# Patient Record
Sex: FEMALE | Race: OTHER | Employment: OTHER | URBAN - METROPOLITAN AREA
[De-identification: names, ages, dates, MRNs, and addresses within clinical notes are randomized per-mention and may not be internally consistent; named-entity substitution may affect disease eponyms.]

---

## 2018-12-17 ENCOUNTER — NEW PATIENT COMPREHENSIVE (OUTPATIENT)
Dept: URBAN - METROPOLITAN AREA CLINIC 35 | Facility: CLINIC | Age: 83
End: 2018-12-17

## 2018-12-17 DIAGNOSIS — H43.812: ICD-10-CM

## 2018-12-17 DIAGNOSIS — H35.3130: ICD-10-CM

## 2018-12-17 PROCEDURE — 4040F PNEUMOC VAC/ADMIN/RCVD: CPT

## 2018-12-17 PROCEDURE — 1036F TOBACCO NON-USER: CPT

## 2018-12-17 PROCEDURE — 4177F TALK PT/CRGVR RE AREDS PREV: CPT

## 2018-12-17 PROCEDURE — G8483 FLU IMM NO ADMIN DOC REA: HCPCS

## 2018-12-17 PROCEDURE — 92134 CPTRZ OPH DX IMG PST SGM RTA: CPT

## 2018-12-17 PROCEDURE — G8427 DOCREV CUR MEDS BY ELIG CLIN: HCPCS

## 2018-12-17 PROCEDURE — 99204 OFFICE O/P NEW MOD 45 MIN: CPT

## 2018-12-17 PROCEDURE — 92015 DETERMINE REFRACTIVE STATE: CPT

## 2018-12-17 PROCEDURE — G9903 PT SCRN TBCO ID AS NON USER: HCPCS

## 2018-12-17 PROCEDURE — 2019F DILATED MACUL EXAM DONE: CPT

## 2018-12-17 ASSESSMENT — VISUAL ACUITY
OS_CC: 20/40-1
OD_CC: J1
OS_SC: 20/100
OS_SC: J10
OD_SC: J10
OD_SC: 20/200
OD_CC: 20/50-2
OS_CC: J3

## 2018-12-17 ASSESSMENT — TONOMETRY
OD_IOP_MMHG: 14
OS_IOP_MMHG: 14

## 2019-02-04 NOTE — PATIENT DISCUSSION
CYLINDER/AXIS CHALLENGED AND CONFIRMED MULTIPLE TIMES - PT. PREFERS AXIS OF MANIFEST (3.0 X 177 OD  2.5 X 156 OS) VS MAPS.

## 2019-02-04 NOTE — PATIENT DISCUSSION
MYOPIA, OU- DISC OPT OF REFRACTIVE [id number]. PT UNDERSTANDS OPTIONS AND DESIRES TO PROCEED WITH REFRACTIVE SX TO IMPROVE VA AND REDUCE DEPENDENCY ON GLS/CTLS.

## 2019-02-14 NOTE — PATIENT DISCUSSION
MYOPIA, OU- DISC OPT OF REFRACTIVE [id number]. PT UNDERSTANDS OPTIONS AND DESIRES TO PROCEED WITH REFRACTIVE SX TO IMPROVE VA AND REDUCE DEPENDENCY ON GLS/CTLS.

## 2019-04-01 NOTE — PATIENT DISCUSSION
OK TO REDUCE ATS TO QID AND USE NON PRESERVED DROPS. STRESSED IMPORTANCE OF WEARING SUNGLASSES WHEN OUTSIDE.

## 2019-06-24 ENCOUNTER — ESTABLISHED COMPREHENSIVE EXAM (OUTPATIENT)
Dept: URBAN - METROPOLITAN AREA CLINIC 35 | Facility: CLINIC | Age: 84
End: 2019-06-24

## 2019-06-24 DIAGNOSIS — H52.03: ICD-10-CM

## 2019-06-24 DIAGNOSIS — H43.812: ICD-10-CM

## 2019-06-24 DIAGNOSIS — H35.3130: ICD-10-CM

## 2019-06-24 DIAGNOSIS — H04.123: ICD-10-CM

## 2019-06-24 PROCEDURE — 92134 CPTRZ OPH DX IMG PST SGM RTA: CPT

## 2019-06-24 PROCEDURE — 92015 DETERMINE REFRACTIVE STATE: CPT

## 2019-06-24 PROCEDURE — 92014 COMPRE OPH EXAM EST PT 1/>: CPT

## 2019-06-24 ASSESSMENT — VISUAL ACUITY
OD_CC: J10
OS_CC: 20/40
OS_CC: J5
OD_CC: 20/200

## 2019-06-24 ASSESSMENT — KERATOMETRY
OD_K2POWER_DIOPTERS: 43.5
OD_K1POWER_DIOPTERS: 46.5

## 2019-06-24 ASSESSMENT — TONOMETRY
OD_IOP_MMHG: 13
OS_IOP_MMHG: 12

## 2019-09-20 NOTE — PATIENT DISCUSSION
6 MONTH POST LASIK OU - DOING WELL, RELEASED TO ROUTINE CARE. PT. UNDERSTANDS THE IMPORTANCE OF ANNUAL CARE TO KEEP VFL ACTIVE.

## 2021-04-13 ENCOUNTER — ESTABLISHED COMPREHENSIVE EXAM (OUTPATIENT)
Dept: URBAN - METROPOLITAN AREA CLINIC 35 | Facility: CLINIC | Age: 86
End: 2021-04-13

## 2021-04-13 DIAGNOSIS — H35.3221: ICD-10-CM

## 2021-04-13 DIAGNOSIS — H35.3110: ICD-10-CM

## 2021-04-13 DIAGNOSIS — H04.123: ICD-10-CM

## 2021-04-13 DIAGNOSIS — H43.812: ICD-10-CM

## 2021-04-13 DIAGNOSIS — H52.03: ICD-10-CM

## 2021-04-13 PROCEDURE — 92015 DETERMINE REFRACTIVE STATE: CPT

## 2021-04-13 PROCEDURE — 92134 CPTRZ OPH DX IMG PST SGM RTA: CPT

## 2021-04-13 PROCEDURE — 92014 COMPRE OPH EXAM EST PT 1/>: CPT

## 2021-04-13 ASSESSMENT — VISUAL ACUITY
OS_CC: J2
OS_CC: 20/60-1
OD_CC: 20/40
OD_CC: J2

## 2021-04-13 ASSESSMENT — KERATOMETRY
OD_K2POWER_DIOPTERS: 44
OS_K1POWER_DIOPTERS: 46.25
OS_K2POWER_DIOPTERS: 43.50
OD_K1POWER_DIOPTERS: 46.25

## 2021-04-13 ASSESSMENT — TONOMETRY
OS_IOP_MMHG: 12
OD_IOP_MMHG: 12

## 2021-04-22 ENCOUNTER — RETINA CONSULT (OUTPATIENT)
Dept: URBAN - METROPOLITAN AREA CLINIC 35 | Facility: CLINIC | Age: 86
End: 2021-04-22

## 2021-04-22 DIAGNOSIS — H35.3221: ICD-10-CM

## 2021-04-22 DIAGNOSIS — H35.3112: ICD-10-CM

## 2021-04-22 DIAGNOSIS — H35.732: ICD-10-CM

## 2021-04-22 DIAGNOSIS — H35.721: ICD-10-CM

## 2021-04-22 DIAGNOSIS — H35.363: ICD-10-CM

## 2021-04-22 PROCEDURE — 99214 OFFICE O/P EST MOD 30 MIN: CPT

## 2021-04-22 PROCEDURE — 67028 INJECTION EYE DRUG: CPT

## 2021-04-22 PROCEDURE — 92250 FUNDUS PHOTOGRAPHY W/I&R: CPT

## 2021-04-22 ASSESSMENT — KERATOMETRY
OD_K2POWER_DIOPTERS: 44
OS_K1POWER_DIOPTERS: 46.25
OD_K1POWER_DIOPTERS: 46.25
OS_K2POWER_DIOPTERS: 43.50

## 2021-04-22 ASSESSMENT — VISUAL ACUITY
OS_CC: 20/100-1
OD_CC: 20/100-1

## 2021-04-22 ASSESSMENT — TONOMETRY
OD_IOP_MMHG: 15
OS_IOP_MMHG: 16

## 2021-06-03 ENCOUNTER — ESTABLISHED PATIENT (OUTPATIENT)
Dept: URBAN - METROPOLITAN AREA CLINIC 35 | Facility: CLINIC | Age: 86
End: 2021-06-03

## 2021-06-03 DIAGNOSIS — H43.812: ICD-10-CM

## 2021-06-03 DIAGNOSIS — H35.732: ICD-10-CM

## 2021-06-03 DIAGNOSIS — H35.363: ICD-10-CM

## 2021-06-03 DIAGNOSIS — H35.721: ICD-10-CM

## 2021-06-03 DIAGNOSIS — H35.3112: ICD-10-CM

## 2021-06-03 DIAGNOSIS — H35.3221: ICD-10-CM

## 2021-06-03 DIAGNOSIS — H35.033: ICD-10-CM

## 2021-06-03 PROCEDURE — 92202 OPSCPY EXTND ON/MAC DRAW: CPT

## 2021-06-03 PROCEDURE — 92134 CPTRZ OPH DX IMG PST SGM RTA: CPT

## 2021-06-03 PROCEDURE — 99213 OFFICE O/P EST LOW 20 MIN: CPT

## 2021-06-03 PROCEDURE — 67028 INJECTION EYE DRUG: CPT

## 2021-06-03 ASSESSMENT — VISUAL ACUITY
OS_CC: 20/60-2
OD_CC: 20/100

## 2021-06-03 ASSESSMENT — KERATOMETRY
OS_K2POWER_DIOPTERS: 43.50
OS_K1POWER_DIOPTERS: 46.25
OD_K2POWER_DIOPTERS: 44
OD_K1POWER_DIOPTERS: 46.25

## 2021-06-03 ASSESSMENT — TONOMETRY
OD_IOP_MMHG: 15
OS_IOP_MMHG: 11

## 2021-07-08 ENCOUNTER — ESTABLISHED PATIENT (OUTPATIENT)
Dept: URBAN - METROPOLITAN AREA CLINIC 35 | Facility: CLINIC | Age: 86
End: 2021-07-08

## 2021-07-08 DIAGNOSIS — H43.812: ICD-10-CM

## 2021-07-08 DIAGNOSIS — H35.033: ICD-10-CM

## 2021-07-08 DIAGNOSIS — H35.732: ICD-10-CM

## 2021-07-08 DIAGNOSIS — H35.3221: ICD-10-CM

## 2021-07-08 DIAGNOSIS — H35.3112: ICD-10-CM

## 2021-07-08 DIAGNOSIS — H35.363: ICD-10-CM

## 2021-07-08 DIAGNOSIS — H35.721: ICD-10-CM

## 2021-07-08 PROCEDURE — 92134 CPTRZ OPH DX IMG PST SGM RTA: CPT

## 2021-07-08 PROCEDURE — 92202 OPSCPY EXTND ON/MAC DRAW: CPT

## 2021-07-08 PROCEDURE — 99213 OFFICE O/P EST LOW 20 MIN: CPT

## 2021-07-08 PROCEDURE — 67028 INJECTION EYE DRUG: CPT

## 2021-07-08 ASSESSMENT — VISUAL ACUITY
OS_CC: 20/70+1
OD_CC: 20/70-2

## 2021-07-08 ASSESSMENT — TONOMETRY
OS_IOP_MMHG: 12
OD_IOP_MMHG: 14

## 2022-07-07 ENCOUNTER — ESTABLISHED PATIENT (OUTPATIENT)
Dept: URBAN - METROPOLITAN AREA CLINIC 35 | Facility: CLINIC | Age: 87
End: 2022-07-07

## 2022-07-07 DIAGNOSIS — H35.721: ICD-10-CM

## 2022-07-07 DIAGNOSIS — H35.732: ICD-10-CM

## 2022-07-07 DIAGNOSIS — H35.033: ICD-10-CM

## 2022-07-07 DIAGNOSIS — H35.363: ICD-10-CM

## 2022-07-07 DIAGNOSIS — H35.3221: ICD-10-CM

## 2022-07-07 DIAGNOSIS — H35.3112: ICD-10-CM

## 2022-07-07 PROCEDURE — 67028 INJECTION EYE DRUG: CPT

## 2022-07-07 PROCEDURE — 92014 COMPRE OPH EXAM EST PT 1/>: CPT

## 2022-07-07 PROCEDURE — 92134 CPTRZ OPH DX IMG PST SGM RTA: CPT

## 2022-07-07 ASSESSMENT — TONOMETRY
OS_IOP_MMHG: 11
OD_IOP_MMHG: 15

## 2022-07-07 ASSESSMENT — VISUAL ACUITY
OS_CC: 20/50+2
OD_CC: 20/70-1

## 2022-09-08 ENCOUNTER — ESTABLISHED PATIENT (OUTPATIENT)
Dept: URBAN - METROPOLITAN AREA CLINIC 35 | Facility: CLINIC | Age: 87
End: 2022-09-08

## 2022-09-08 DIAGNOSIS — H43.812: ICD-10-CM

## 2022-09-08 DIAGNOSIS — H35.3112: ICD-10-CM

## 2022-09-08 DIAGNOSIS — H35.3221: ICD-10-CM

## 2022-09-08 DIAGNOSIS — H04.123: ICD-10-CM

## 2022-09-08 DIAGNOSIS — H35.732: ICD-10-CM

## 2022-09-08 DIAGNOSIS — H53.8: ICD-10-CM

## 2022-09-08 DIAGNOSIS — H35.033: ICD-10-CM

## 2022-09-08 DIAGNOSIS — H35.30: ICD-10-CM

## 2022-09-08 DIAGNOSIS — H35.363: ICD-10-CM

## 2022-09-08 DIAGNOSIS — H35.721: ICD-10-CM

## 2022-09-08 PROCEDURE — 99214 OFFICE O/P EST MOD 30 MIN: CPT

## 2022-09-08 PROCEDURE — 92250 FUNDUS PHOTOGRAPHY W/I&R: CPT

## 2022-09-08 PROCEDURE — 67028 INJECTION EYE DRUG: CPT

## 2022-09-08 ASSESSMENT — TONOMETRY
OD_IOP_MMHG: 16
OS_IOP_MMHG: 15

## 2022-09-08 ASSESSMENT — VISUAL ACUITY
OS_CC: 20/60-2
OD_CC: 20/200

## 2023-01-05 ENCOUNTER — FOLLOW UP (OUTPATIENT)
Dept: URBAN - METROPOLITAN AREA CLINIC 35 | Facility: CLINIC | Age: 88
End: 2023-01-05

## 2023-01-05 DIAGNOSIS — H43.812: ICD-10-CM

## 2023-01-05 DIAGNOSIS — H04.123: ICD-10-CM

## 2023-01-05 DIAGNOSIS — H35.033: ICD-10-CM

## 2023-01-05 DIAGNOSIS — H35.30: ICD-10-CM

## 2023-01-05 DIAGNOSIS — H35.721: ICD-10-CM

## 2023-01-05 DIAGNOSIS — H34.8120: ICD-10-CM

## 2023-01-05 DIAGNOSIS — H35.3112: ICD-10-CM

## 2023-01-05 DIAGNOSIS — H35.3221: ICD-10-CM

## 2023-01-05 DIAGNOSIS — H35.363: ICD-10-CM

## 2023-01-05 DIAGNOSIS — H35.732: ICD-10-CM

## 2023-01-05 PROCEDURE — 92250 FUNDUS PHOTOGRAPHY W/I&R: CPT

## 2023-01-05 PROCEDURE — 67028 INJECTION EYE DRUG: CPT

## 2023-01-05 PROCEDURE — 99214 OFFICE O/P EST MOD 30 MIN: CPT

## 2023-01-05 ASSESSMENT — TONOMETRY
OS_IOP_MMHG: 15
OD_IOP_MMHG: 15

## 2023-01-05 ASSESSMENT — VISUAL ACUITY
OD_CC: 20/70
OS_CC: CF 4FT